# Patient Record
Sex: FEMALE | Race: ASIAN | NOT HISPANIC OR LATINO
[De-identification: names, ages, dates, MRNs, and addresses within clinical notes are randomized per-mention and may not be internally consistent; named-entity substitution may affect disease eponyms.]

---

## 2020-11-03 PROBLEM — Z00.00 ENCOUNTER FOR PREVENTIVE HEALTH EXAMINATION: Status: ACTIVE | Noted: 2020-11-03

## 2020-12-24 ENCOUNTER — APPOINTMENT (OUTPATIENT)
Dept: PULMONOLOGY | Facility: CLINIC | Age: 25
End: 2020-12-24
Payer: COMMERCIAL

## 2020-12-24 VITALS
SYSTOLIC BLOOD PRESSURE: 120 MMHG | TEMPERATURE: 98.7 F | BODY MASS INDEX: 17.97 KG/M2 | HEIGHT: 61 IN | DIASTOLIC BLOOD PRESSURE: 79 MMHG | OXYGEN SATURATION: 98 % | RESPIRATION RATE: 16 BRPM | HEART RATE: 74 BPM | WEIGHT: 95.2 LBS

## 2020-12-24 DIAGNOSIS — Z78.9 OTHER SPECIFIED HEALTH STATUS: ICD-10-CM

## 2020-12-24 DIAGNOSIS — Z83.2 FAMILY HISTORY OF DISEASES OF THE BLOOD AND BLOOD-FORMING ORGANS AND CERTAIN DISORDERS INVOLVING THE IMMUNE MECHANISM: ICD-10-CM

## 2020-12-24 DIAGNOSIS — Z87.09 PERSONAL HISTORY OF OTHER DISEASES OF THE RESPIRATORY SYSTEM: ICD-10-CM

## 2020-12-24 DIAGNOSIS — Z82.69 FAMILY HISTORY OF OTHER DISEASES OF THE MUSCULOSKELETAL SYSTEM AND CONNECTIVE TISSUE: ICD-10-CM

## 2020-12-24 DIAGNOSIS — Z88.9 ALLERGY STATUS TO UNSPECIFIED DRUGS, MEDICAMENTS AND BIOLOGICAL SUBSTANCES: ICD-10-CM

## 2020-12-24 PROCEDURE — 94010 BREATHING CAPACITY TEST: CPT

## 2020-12-24 PROCEDURE — 95012 NITRIC OXIDE EXP GAS DETER: CPT

## 2020-12-24 PROCEDURE — 99204 OFFICE O/P NEW MOD 45 MIN: CPT | Mod: 25

## 2020-12-24 PROCEDURE — 99072 ADDL SUPL MATRL&STAF TM PHE: CPT

## 2020-12-24 PROCEDURE — 71046 X-RAY EXAM CHEST 2 VIEWS: CPT

## 2020-12-24 PROCEDURE — 94726 PLETHYSMOGRAPHY LUNG VOLUMES: CPT

## 2020-12-24 PROCEDURE — 94618 PULMONARY STRESS TESTING: CPT

## 2020-12-24 PROCEDURE — 94729 DIFFUSING CAPACITY: CPT

## 2020-12-24 NOTE — ASSESSMENT
[FreeTextEntry1] : Ms. ELIZABETH is a 25 year old female with a history of bronchiolitis as a child, asthma, allergies, who comes into the office today for pulmonary evaluation.\par \par The patient's shortness of breath is multifactorial due to:\par -pulmonary disease \par      -asthma \par -poor breathing mechanics\par -out of shape\par \par Problem 1: Asthma\par -Add Breo Ellipta 200 at 1 inhalation daily \par -continue ProAir rescue inhaler 2 inhalations before exercise, Q6H \par -continue Singulair 10 mg before bed \par -Asthma is believed to be caused by inherited (genetic) and environmental factor, but its exact cause is unknown. Asthma may be triggered by allergens, lung infections, or irritants in the air. Asthma triggers are different for each person \par -Inhaler technique reviewed as well as oral hygiene techniques reviewed with patient. Avoidance of cold air, extremes of temperature, rescue inhaler should be used before exercise. Order of medication reviewed with patient. Recommended use of a cool mist humidifier in the bedroom.\par \par Problem 2: Allergies\par -add Xyzal 5 mg qHS \par -Complete blood work to include: IgE level, eosinophil level, vitamin D level, food IgE level, and asthma profile \par Environmental measures for allergies were encouraged including mattress and pillow cover, air purifier, and environmental controls. \par \par Problem 3: Poor Sleep\par -Recommended to complete a home sleep study due to her poor sleep schedule and EDS\par Sleep apnea is associated with adverse clinical consequences which an affect most organ systems. Cardiovascular disease risk includes arrhythmias, atrial fibrillation, hypertension, coronary artery disease, and stroke. Metabolic disorders include diabetes type 2, non-alcoholic fatty liver disease. Mood disorder especially depression; and cognitive decline especially in the elderly. Associations with chronic reflux/Galan’s esophagus some but not all inclusive. \par -Reasons include arousal consistent with hypopnea; respiratory events most prominent in REM sleep or supine position; therefore sleep staging and body position are important for accurate diagnosis and estimation of AHI. \par \par Problem 4: Poor Mechanics of Breathing\par - Proper breathing techniques were reviewed with an emphasis of exhalation. Patient instructed to breath in for 1 second and out for four seconds. Patient was encouraged to not talk while walking. \par \par Problem 5: health maintenance\par -s/p influenza vaccine\par -recommended strep pneumonia vaccines after age 65: Prevnar-13 vaccine, followed by Pneumo vaccine 23 one year following\par -recommended early intervention for URIs\par -recommended regular osteoporosis evaluations\par -recommended early dermatological evaluations\par -recommended after the age of 50 to the age of 70, colonoscopy every 5 years \par \par  Follow up in 6-8 weeks\par -he  is recommended to call with any changes, questions, or concerns.

## 2020-12-24 NOTE — ADDENDUM
[FreeTextEntry1] : Documented by John Martin acting as a scribe for Dr. Ty Yeager on 12/24/2020.\par \par All medical record entries made by the Scribe were at my, Dr. Ty Yeager's, direction and personally dictated by me on 12/24/2020. I have reviewed the chart and agree that the record accurately reflects my personal performance of the history, physical exam, assessment and plan. I have also personally directed, reviewed, and agree with the discharge instructions.

## 2020-12-24 NOTE — PROCEDURE
[FreeTextEntry1] : CXR reveals a normal sized heart; no evidence of infiltrate or effusion--a normal appearing chest radiograph\par \par Full PFT revealed mild obstructive dysfunction at mid-low lung volumes, with a FEV1 of 2.39L, which is 83% of predicted, normal lung volumes, and a normal diffusion of 17.5, which is 92% of predicted, with a normal flow volume loop\par \par 6 minute walk test reveals a low saturation of 97% with no evidence of dyspnea or fatigue; walked 635.7   meters\par  \par \par FENO was 8; a normal value being less than 25\par Fractional exhaled nitric oxide (FENO) is regarded as a simple, noninvasive method for assessing eosinophilic airway inflammation. Produced by a variety of cells within the lung, nitric oxide (NO) concentrations are generally low in healthy individuals. However, high concentrations of NO appear to be involved in nonspecific host defense mechanisms and chronic inflammatory diseases such as asthma. The American Thor[employer] Society (ATS) therefore has recommended using FENO to aid in the diagnosis and monitoring of eosinophilic airway inflammation and asthma, and for identifying steroid responsive individuals whose chronic respiratory symptoms may be caused by airway inflammation.

## 2020-12-24 NOTE — REVIEW OF SYSTEMS
[EDS] : EDS [Seasonal Allergies] : seasonal allergies [Negative] : Endocrine [Recent Wt Gain (___ Lbs)] : ~T no recent weight gain [Recent Wt Loss (___ Lbs)] : ~T no recent weight loss [Cough] : no cough [Chest Tightness] : no chest tightness [Dyspnea] : no dyspnea [Wheezing] : no wheezing [SOB on Exertion] : no sob on exertion [Chest Discomfort] : no chest discomfort [Leg Cramps] : no leg cramps [Itchy Eyes] : no itchy eyes [GERD] : no gerd [Diarrhea] : no diarrhea [Constipation] : no constipation [Dysphagia] : no dysphagia [Arthralgias] : no arthralgias [Myalgias] : no myalgias [Dizziness] : no dizziness

## 2020-12-24 NOTE — HISTORY OF PRESENT ILLNESS
[TextBox_4] : Ms. ELIZABETH is a 25 year old female presenting to the office today for initial pulmonary evaluation. Her chief complaint is asthma maintenance.\par -she reports she has had asthma since she was a toddler. She had bronchiolitis as a baby\par -her asthma triggers include: URIs, change of season, occasionally cold air\par -her asthma symptoms include: chest tightness, cough, post nasal drip, rare wheezing\par -she has never been on an oral steroid for asthma\par -she has allergies to mold (dead leaves), dust\par -her allergy symptoms include: difficulty breathing, sneezing\par -she states she sleeps about 6 hours per night, and some days feels rested. She notes her sleep schedule is off as she works nights (4PM-12AM, 4PM-2AM, 4PM-4AM), and occasionally takes naps during the day\par -she is unsure if she snores, but she suspects she does\par -she reports her weight is stable\par -she could fall asleep while watching a boring TV show \par -she reports her menstrual cycle is normal, and is not on birth control\par -she notes her vision is good\par -she has not gotten Covid\par -she reports she runs for exercise, but hasn’t been running for the past month\par -she denies any muscle cramps, chest pain, chest pressure, diarrhea, constipation, dysphagia, dizziness, leg swelling, leg pain, itchy eyes, itchy ears, heartburn, reflux, sour taste in the mouth, myalgias or arthralgias.

## 2021-01-25 DIAGNOSIS — Z01.812 ENCOUNTER FOR PREPROCEDURAL LABORATORY EXAMINATION: ICD-10-CM

## 2021-02-22 ENCOUNTER — APPOINTMENT (OUTPATIENT)
Dept: PULMONOLOGY | Facility: CLINIC | Age: 26
End: 2021-02-22

## 2021-03-30 ENCOUNTER — APPOINTMENT (OUTPATIENT)
Dept: PULMONOLOGY | Facility: CLINIC | Age: 26
End: 2021-03-30
Payer: COMMERCIAL

## 2021-03-30 VITALS
HEART RATE: 77 BPM | HEIGHT: 61 IN | WEIGHT: 99 LBS | SYSTOLIC BLOOD PRESSURE: 110 MMHG | BODY MASS INDEX: 18.69 KG/M2 | DIASTOLIC BLOOD PRESSURE: 60 MMHG | OXYGEN SATURATION: 99 % | RESPIRATION RATE: 16 BRPM | TEMPERATURE: 97.8 F

## 2021-03-30 DIAGNOSIS — Z71.89 OTHER SPECIFIED COUNSELING: ICD-10-CM

## 2021-03-30 PROCEDURE — 99072 ADDL SUPL MATRL&STAF TM PHE: CPT

## 2021-03-30 PROCEDURE — 99214 OFFICE O/P EST MOD 30 MIN: CPT

## 2021-03-30 NOTE — ASSESSMENT
[FreeTextEntry1] : Ms. ELIZABETH is a 25 year old female with a history of bronchiolitis as a child, asthma, allergies, who comes into the office today for pulmonary evaluation. -stable\par \par The patient's shortness of breath is multifactorial due to:\par -pulmonary disease \par      -asthma \par -poor breathing mechanics\par -out of shape\par \par Problem 1: Asthma\par -Add Breo Ellipta 200 at 1 inhalation daily \par -continue ProAir rescue inhaler 2 inhalations before exercise, Q6H \par -continue Singulair 10 mg before bed \par -Asthma is believed to be caused by inherited (genetic) and environmental factor, but its exact cause is unknown. Asthma may be triggered by allergens, lung infections, or irritants in the air. Asthma triggers are different for each person \par -Inhaler technique reviewed as well as oral hygiene techniques reviewed with patient. Avoidance of cold air, extremes of temperature, rescue inhaler should be used before exercise. Order of medication reviewed with patient. Recommended use of a cool mist humidifier in the bedroom.\par \par Problem 2: Allergies\par -add Xyzal 5 mg qHS \par -Complete blood work to include: IgE level, eosinophil level, vitamin D level, food IgE level, and asthma profile \par Environmental measures for allergies were encouraged including mattress and pillow cover, air purifier, and environmental controls. \par \par Problem 3: Poor Sleep\par -Recommended to complete a home sleep study due to her poor sleep schedule and EDS\par Sleep apnea is associated with adverse clinical consequences which an affect most organ systems. Cardiovascular disease risk includes arrhythmias, atrial fibrillation, hypertension, coronary artery disease, and stroke. Metabolic disorders include diabetes type 2, non-alcoholic fatty liver disease. Mood disorder especially depression; and cognitive decline especially in the elderly. Associations with chronic reflux/Galan’s esophagus some but not all inclusive. \par -Reasons include arousal consistent with hypopnea; respiratory events most prominent in REM sleep or supine position; therefore sleep staging and body position are important for accurate diagnosis and estimation of AHI. \par \par Problem 4: Poor Mechanics of Breathing\par - Proper breathing techniques were reviewed with an emphasis of exhalation. Patient instructed to breath in for 1 second and out for four seconds. Patient was encouraged to not talk while walking. \par \par Problem 5: health maintenance\par - s/p covid 19 vaccine ([employer]ino [employer]) \par -s/p influenza vaccine 2020\par -recommended strep pneumonia vaccines after age 65: Prevnar-13 vaccine, followed by Pneumo vaccine 23 one year following\par -recommended early intervention for URIs\par -recommended regular osteoporosis evaluations\par -recommended early dermatological evaluations\par -recommended after the age of 50 to the age of 70, colonoscopy every 5 years \par \par  Follow up in 4 months \par -he  is recommended to call with any changes, questions, or concerns.

## 2021-03-30 NOTE — HISTORY OF PRESENT ILLNESS
[TextBox_4] : Ms. ELIZABETH is a 25 year old female presenting to the office today for a follow up pulmonary evaluation. Her chief complaint is \par -She notes feeling well in general \par -She notes exercising indoors due to the cold air\par -She notes eating well \par -She notes feeling fatigued\par -She notes working at the [employer] office, but notes being bored\par -She denies SOB\par -She denies SOB on her back or in the middle of the night \par -she notes her sleep is on and off, depending on the time she goes to bed \par -She denies coughing and wheezing \par -She notes her asthma is controlled \par -She notes being unsure if she is snoring \par - s/p covid 19 vaccine ([employer]) \par \par - patient denies any headaches, nausea, vomiting, fever, chills, sweats, chest pain, chest pressure, palpitations,coughing, wheezing, fatigue, diarrhea, constipation, dysphagia, myalgias, dizziness, leg swelling, leg pain, itchy eyes, itchy ears, heartburn, reflux or sour taste in the mouth

## 2021-03-30 NOTE — ADDENDUM
[FreeTextEntry1] : Documented by Corby Phipps acting as a scribe for Dr. Ty Yeager on (03/30/2021).\par \par All medical record entries made by the Scribe were at my, Dr. Ty Yeager's, direction and personally dictated by me on (03/30/2021). I have reviewed the chart and agree that the record accurately reflects my personal performance of the history, physical exam, assessment and plan. I have also personally directed, reviewed, and agree with the discharge instructions.\par

## 2021-03-30 NOTE — PHYSICAL EXAM
[No Acute Distress] : no acute distress [Normal Oropharynx] : normal oropharynx [II] : Mallampati Class: II [Normal Appearance] : normal appearance [No Neck Mass] : no neck mass [Normal S1, S2] : normal s1, s2 [Normal Rate/Rhythm] : normal rate/rhythm [No Murmurs] : no murmurs [No Resp Distress] : no resp distress [Clear to Auscultation Bilaterally] : clear to auscultation bilaterally [No Abnormalities] : no abnormalities [Benign] : benign [Normal Gait] : normal gait [No Clubbing] : no clubbing [No Cyanosis] : no cyanosis [FROM] : FROM [No Edema] : no edema [Normal Color/ Pigmentation] : normal color/ pigmentation [No Focal Deficits] : no focal deficits [Oriented x3] : oriented x3 [Normal Affect] : normal affect [TextBox_68] : I:E ratio 1:3; clear

## 2021-08-17 ENCOUNTER — APPOINTMENT (OUTPATIENT)
Dept: PULMONOLOGY | Facility: CLINIC | Age: 26
End: 2021-08-17

## 2022-01-05 ENCOUNTER — APPOINTMENT (OUTPATIENT)
Dept: PULMONOLOGY | Facility: CLINIC | Age: 27
End: 2022-01-05
Payer: COMMERCIAL

## 2022-01-05 VITALS — BODY MASS INDEX: 18.88 KG/M2 | WEIGHT: 100 LBS | HEIGHT: 61 IN

## 2022-01-05 DIAGNOSIS — R06.02 SHORTNESS OF BREATH: ICD-10-CM

## 2022-01-05 DIAGNOSIS — Z72.820 SLEEP DEPRIVATION: ICD-10-CM

## 2022-01-05 PROCEDURE — 99214 OFFICE O/P EST MOD 30 MIN: CPT | Mod: 95

## 2022-01-05 RX ORDER — FLUTICASONE FUROATE AND VILANTEROL TRIFENATATE 200; 25 UG/1; UG/1
200-25 POWDER RESPIRATORY (INHALATION) DAILY
Qty: 3 | Refills: 1 | Status: ACTIVE | COMMUNITY
Start: 2022-01-05 | End: 1900-01-01

## 2022-01-05 RX ORDER — MONTELUKAST 10 MG/1
10 TABLET, FILM COATED ORAL
Qty: 1 | Refills: 1 | Status: ACTIVE | COMMUNITY

## 2022-01-05 NOTE — ASSESSMENT
[FreeTextEntry1] : Ms. ELIZABETH is a 26 year old female with a history of bronchiolitis as a child, asthma, allergies, who comes into the office today via video call for pulmonary evaluation. - active due to influenza 12/2021\par \par The patient's shortness of breath is multifactorial due to:\par -pulmonary disease \par      -asthma \par -poor breathing mechanics\par -out of shape\par \par Problem 1: Asthma (active), influenza \par -continue Breo Ellipta 200 at 1 inhalation daily \par -continue ProAir rescue inhaler 2 inhalations before exercise, Q6H - restart\par -continue Singulair 10 mg before bed \par -Asthma is believed to be caused by inherited (genetic) and environmental factor, but its exact cause is unknown. Asthma may be triggered by allergens, lung infections, or irritants in the air. Asthma triggers are different for each person \par -Inhaler technique reviewed as well as oral hygiene techniques reviewed with patient. Avoidance of cold air, extremes of temperature, rescue inhaler should be used before exercise. Order of medication reviewed with patient. Recommended use of a cool mist humidifier in the bedroom.\par \par Problem 2: Allergies\par -continue Xyzal 5 mg qHS \par -Complete blood work to include: IgE level, eosinophil level, vitamin D level, food IgE level, and asthma profile \par Environmental measures for allergies were encouraged including mattress and pillow cover, air purifier, and environmental controls. \par \par Problem 3: Poor Sleep\par -Recommended to complete a home sleep study due to her poor sleep schedule and EDS\par Sleep apnea is associated with adverse clinical consequences which an affect most organ systems. Cardiovascular disease risk includes arrhythmias, atrial fibrillation, hypertension, coronary artery disease, and stroke. Metabolic disorders include diabetes type 2, non-alcoholic fatty liver disease. Mood disorder especially depression; and cognitive decline especially in the elderly. Associations with chronic reflux/Galan’s esophagus some but not all inclusive. \par -Reasons include arousal consistent with hypopnea; respiratory events most prominent in REM sleep or supine position; therefore sleep staging and body position are important for accurate diagnosis and estimation of AHI. \par \par Problem 4: Poor Mechanics of Breathing\par -Recommended Wim Hof and Buteyko breathing techniques \par - Proper breathing techniques were reviewed with an emphasis of exhalation. Patient instructed to breath in for 1 second and out for four seconds. Patient was encouraged to not talk while walking. \par \par Problem 5: health maintenance\par - s/p covid 19 vaccine ([employer]) and Moderna booster\par -s/p influenza vaccine 2021\par -recommended strep pneumonia vaccines after age 65: Prevnar-13 vaccine, followed by Pneumo vaccine 23 one year following\par -recommended early intervention for URIs\par -recommended regular osteoporosis evaluations\par -recommended early dermatological evaluations\par -recommended after the age of 50 to the age of 70, colonoscopy every 5 years \par \par  Follow up in 4 months \par -he  is recommended to call with any changes, questions, or concerns.

## 2022-01-05 NOTE — HISTORY OF PRESENT ILLNESS
[Home] : at home, [unfilled] , at the time of the visit. [Medical Office: ([facility])___] : at the medical office located in  [Verbal consent obtained from patient] : the patient, [unfilled] [TextBox_4] : Ms. ELIZABETH is a 25 year old female presenting to the office today via video call for a follow up pulmonary evaluation. Her chief complaint is \par -she notes she just had the flu\par -she notes she had a fever this past weekend\par -she notes she has some phlegm production\par -she notes having diarrhea at the beginning of the week\par -she notes using her inhaler 1/3/2022 but hasn't needed it since\par -she notes feeling recovered from the flu one day and other days she feels more congested\par -she notes she feels okay in the last 1-2 days\par -she notes before 1/3/2022 she was sleeping a lot more but has not gotten much sleep since 1/3/2022\par -she notes she has been feeling fatigued but is unable to fall asleep\par -she notes her weight is stable\par -she notes her menstrual cycle has not been regular and is currently a week late\par -she notes it has been irregular since she got the COVID vaccine (JnJ and Moderna booster)\par -she notes no one else around her got sick\par -she notes she only takes the rescue inhaler when needed\par -she notes her asthma generally feels fine right now\par \par -patient denies any headaches, nausea, vomiting, chills, sweats, chest pain, chest pressure, palpitations, coughing, wheezing, constipation, dysphagia, myalgias, dizziness, leg swelling, leg pain, itchy eyes, itchy ears, heartburn, reflux or sour taste in the mouth

## 2022-01-05 NOTE — ADDENDUM
[FreeTextEntry1] : Documented by John Villanueva acting as a scribe for Dr. Ty Yeager on 01/05/2022\par \par All medical record entries made by the scribe were at my, Dr. Ty Yeager's, direction and personally dictated by me on 01/05/2022. I have reviewed the chart and agree that the record accurately reflects my personal performance of the history, physical exam, assessment, and plan. I have also personally directed, reviewed, and agree with the discharge instructions.

## 2022-01-13 ENCOUNTER — APPOINTMENT (OUTPATIENT)
Dept: PULMONOLOGY | Facility: CLINIC | Age: 27
End: 2022-01-13

## 2022-03-11 ENCOUNTER — TRANSCRIPTION ENCOUNTER (OUTPATIENT)
Age: 27
End: 2022-03-11

## 2022-03-11 ENCOUNTER — APPOINTMENT (OUTPATIENT)
Dept: PULMONOLOGY | Facility: CLINIC | Age: 27
End: 2022-03-11
Payer: COMMERCIAL

## 2022-03-11 VITALS — HEIGHT: 61 IN | BODY MASS INDEX: 18.88 KG/M2 | WEIGHT: 100 LBS

## 2022-03-11 DIAGNOSIS — J45.909 UNSPECIFIED ASTHMA, UNCOMPLICATED: ICD-10-CM

## 2022-03-11 DIAGNOSIS — J30.9 ALLERGIC RHINITIS, UNSPECIFIED: ICD-10-CM

## 2022-03-11 PROCEDURE — 99214 OFFICE O/P EST MOD 30 MIN: CPT | Mod: 95

## 2022-03-11 RX ORDER — FLUTICASONE FUROATE AND VILANTEROL TRIFENATATE 200; 25 UG/1; UG/1
200-25 POWDER RESPIRATORY (INHALATION) DAILY
Qty: 3 | Refills: 1 | Status: DISCONTINUED | COMMUNITY
Start: 2020-12-24 | End: 2022-03-11

## 2022-03-11 RX ORDER — LEVOCETIRIZINE DIHYDROCHLORIDE 5 MG/1
5 TABLET ORAL
Qty: 1 | Refills: 1 | Status: ACTIVE | COMMUNITY
Start: 1900-01-01 | End: 1900-01-01

## 2022-03-11 RX ORDER — ALBUTEROL SULFATE 90 UG/1
108 (90 BASE) AEROSOL, METERED RESPIRATORY (INHALATION)
Qty: 1 | Refills: 1 | Status: ACTIVE | COMMUNITY

## 2022-03-11 RX ORDER — ALBUTEROL SULFATE 2.5 MG/3ML
(2.5 MG/3ML) SOLUTION RESPIRATORY (INHALATION)
Qty: 1 | Refills: 1 | Status: ACTIVE | COMMUNITY
Start: 2022-03-11 | End: 1900-01-01

## 2022-03-11 NOTE — HISTORY OF PRESENT ILLNESS
[TextBox_4] : Ms. ELIZABETH is a 26 year old female with a history of bronchiolitis as a child, influenze (12/2021), asthma, & allergies who presents via video for an acute care evaluation. \par \par Her chief concern is sore throat and chest tightness x 3 days. \par \par Patient states she used her Proair inhaler 2-3 times over the past two days.  She admits to relief of chest tightness, but only for a short period of time.  She is unsure if it's r/t her allergies.  She does admit to intermittent, dry cough.  She notes she has not taken daily antihistamine, as she usually does not initiate therapy with it until mid April. She notes she is complaint on daily Breo and nightly Singulair. \par \par She denies fever/chills, decreased appetite, fatigue, SOB @ rest or exertion, or wheezing.

## 2022-03-11 NOTE — REASON FOR VISIT
[Home] : at home, [unfilled] , at the time of the visit. [Medical Office: ([facility])___] : at the medical office located in  [Verbal consent obtained from patient] : the patient, [unfilled] [Acute] : an acute visit [Asthma] : asthma [Cough] : cough

## 2022-03-11 NOTE — PHYSICAL EXAM
[No Acute Distress] : no acute distress [Well Nourished] : well nourished [No Deformities] : no deformities [Well Developed] : well developed [No Resp Distress] : no resp distress [Normal Color/ Pigmentation] : normal color/ pigmentation [Oriented x3] : oriented x3 [Normal Mood] : normal mood [Normal Affect] : normal affect

## 2022-03-11 NOTE — REVIEW OF SYSTEMS
[Sore Throat] : sore throat [Cough] : cough [Chest Tightness] : chest tightness [Seasonal Allergies] : seasonal allergies [Negative] : Endocrine [Sputum] : no sputum [Dyspnea] : no dyspnea [Wheezing] : no wheezing [SOB on Exertion] : no sob on exertion

## 2022-03-11 NOTE — ASSESSMENT
[FreeTextEntry1] : Ms. ELIZABETH is a 26 year old female with a history of bronchiolitis as a child, influenze (12/2021), asthma, & allergies who presents via video for an acute care evaluation. Her chief concern is sore throat and chest tightness x 3 days. \par \par 1. Asthma:\par - Add Albuterol via neb BID x 7 days. Can use Proair HFA 2 puffs Q4-6H PRN.\par - Continue Breo 200 mcg; 1 inhale Q AM - rinse and gargle post use.\par - Continue Montelukast 10 mg PO QHS. \par \par 2. Allergic Rhinitis:\par - Add Levocetirizine 5 mg PO QHS.\par \par Patient advised to call symptoms worsen. \par Patient to follow up with Dr. Yeager as scheduled.\par Patient to call with further questions and concerns.\par Patient verbalizes understanding of care plan and is agreeable.\par \par